# Patient Record
Sex: MALE | URBAN - METROPOLITAN AREA
[De-identification: names, ages, dates, MRNs, and addresses within clinical notes are randomized per-mention and may not be internally consistent; named-entity substitution may affect disease eponyms.]

---

## 2024-02-21 ENCOUNTER — APPOINTMENT (RX ONLY)
Dept: URBAN - METROPOLITAN AREA CLINIC 45 | Facility: CLINIC | Age: 79
Setting detail: DERMATOLOGY
End: 2024-02-21

## 2024-02-21 DIAGNOSIS — L21.8 OTHER SEBORRHEIC DERMATITIS: ICD-10-CM | Status: INADEQUATELY CONTROLLED

## 2024-02-21 PROCEDURE — ? PRESCRIPTION MEDICATION MANAGEMENT

## 2024-02-21 PROCEDURE — 99204 OFFICE O/P NEW MOD 45 MIN: CPT

## 2024-02-21 PROCEDURE — ? PRESCRIPTION

## 2024-02-21 PROCEDURE — ? COUNSELING

## 2024-02-21 RX ORDER — FLUOCINOLONE ACETONIDE 0.11 MG/ML
OIL AURICULAR (OTIC)
Qty: 20 | Refills: 2 | Status: ERX | COMMUNITY
Start: 2024-02-21

## 2024-02-21 RX ORDER — KETOCONAZOLE 20 MG/G
CREAM TOPICAL QD
Qty: 30 | Refills: 5 | Status: ERX | COMMUNITY
Start: 2024-02-21

## 2024-02-21 RX ORDER — KETOCONAZOLE 20 MG/ML
SHAMPOO, SUSPENSION TOPICAL BIW
Qty: 360 | Refills: 6 | Status: ERX | COMMUNITY
Start: 2024-02-21

## 2024-02-21 RX ADMIN — KETOCONAZOLE: 20 CREAM TOPICAL at 00:00

## 2024-02-21 RX ADMIN — KETOCONAZOLE: 20 SHAMPOO, SUSPENSION TOPICAL at 00:00

## 2024-02-21 RX ADMIN — FLUOCINOLONE ACETONIDE: 0.11 OIL AURICULAR (OTIC) at 00:00

## 2024-02-21 ASSESSMENT — LOCATION ZONE DERM
LOCATION ZONE: EYELID
LOCATION ZONE: EAR
LOCATION ZONE: FACE
LOCATION ZONE: SCALP

## 2024-02-21 ASSESSMENT — LOCATION DETAILED DESCRIPTION DERM
LOCATION DETAILED: RIGHT CRUS OF HELIX
LOCATION DETAILED: LEFT LATERAL SUPERIOR EYELID
LOCATION DETAILED: GLABELLA
LOCATION DETAILED: LEFT CRUS OF HELIX
LOCATION DETAILED: LEFT SUPERIOR PARIETAL SCALP
LOCATION DETAILED: RIGHT LATERAL SUPERIOR EYELID

## 2024-02-21 ASSESSMENT — LOCATION SIMPLE DESCRIPTION DERM
LOCATION SIMPLE: GLABELLA
LOCATION SIMPLE: RIGHT EAR
LOCATION SIMPLE: RIGHT SUPERIOR EYELID
LOCATION SIMPLE: LEFT EAR
LOCATION SIMPLE: LEFT SUPERIOR EYELID
LOCATION SIMPLE: SCALP

## 2024-02-21 NOTE — PROCEDURE: PRESCRIPTION MEDICATION MANAGEMENT
Detail Level: Zone
Render In Strict Bullet Format?: No
Initiate Treatment: .\\n.\\nketoconazole 2 % shampoo BIW\\nQuantity: 360.0 ml  Days Supply: 90\\nSig: Shampoo every other day, leave lather on scalp for 10 mins and rinse well.\\n.\\nketoconazole 2 % topical cream QD\\nQuantity: 30.0 g  Days Supply: 30\\nSig: Apply once daily to forehead, eyebrows and eyelids\\n.\\nDermOtic Oil 0.01 % ear drops \\nQuantity: 20.0 ml  Days Supply: 30\\nSig: Apply 5 drops to each ear twice daily\\n.

## 2024-02-21 NOTE — HPI: RASH (SEBORRHEIC DERMATITIS)
How Severe Is It?: mild
Is This A New Presentation, Or A Follow-Up?: Rash
Additional History: Dandruff shampoo

## 2024-03-20 ENCOUNTER — APPOINTMENT (RX ONLY)
Dept: URBAN - METROPOLITAN AREA CLINIC 45 | Facility: CLINIC | Age: 79
Setting detail: DERMATOLOGY
End: 2024-03-20

## 2024-03-20 DIAGNOSIS — L21.8 OTHER SEBORRHEIC DERMATITIS: ICD-10-CM | Status: INADEQUATELY CONTROLLED

## 2024-03-20 PROCEDURE — 99214 OFFICE O/P EST MOD 30 MIN: CPT

## 2024-03-20 PROCEDURE — ? TREATMENT REGIMEN

## 2024-03-20 PROCEDURE — ? PRESCRIPTION MEDICATION MANAGEMENT

## 2024-03-20 PROCEDURE — ? COUNSELING

## 2024-03-20 PROCEDURE — ? PRESCRIPTION

## 2024-03-20 RX ORDER — HYDROCORTISONE 25 MG/G
CREAM TOPICAL
Qty: 28 | Refills: 1 | Status: ERX | COMMUNITY
Start: 2024-03-20

## 2024-03-20 RX ADMIN — HYDROCORTISONE: 25 CREAM TOPICAL at 00:00

## 2024-03-20 ASSESSMENT — LOCATION ZONE DERM
LOCATION ZONE: EYELID
LOCATION ZONE: FACE
LOCATION ZONE: EAR
LOCATION ZONE: SCALP

## 2024-03-20 ASSESSMENT — LOCATION DETAILED DESCRIPTION DERM
LOCATION DETAILED: RIGHT LATERAL SUPERIOR EYELID
LOCATION DETAILED: LEFT CRUS OF HELIX
LOCATION DETAILED: LEFT LATERAL SUPERIOR EYELID
LOCATION DETAILED: GLABELLA
LOCATION DETAILED: LEFT SUPERIOR PARIETAL SCALP
LOCATION DETAILED: RIGHT CRUS OF HELIX

## 2024-03-20 ASSESSMENT — LOCATION SIMPLE DESCRIPTION DERM
LOCATION SIMPLE: RIGHT EAR
LOCATION SIMPLE: RIGHT SUPERIOR EYELID
LOCATION SIMPLE: LEFT SUPERIOR EYELID
LOCATION SIMPLE: GLABELLA
LOCATION SIMPLE: LEFT EAR
LOCATION SIMPLE: SCALP

## 2024-03-20 ASSESSMENT — SEVERITY ASSESSMENT: HOW SEVERE IS THIS PATIENT'S CONDITION?: MILD

## 2024-03-20 NOTE — PROCEDURE: TREATMENT REGIMEN
Initiate Treatment: Taraum shampoo every other day alternate with Ketoconzole shampoo
Detail Level: Zone

## 2024-03-20 NOTE — PROCEDURE: PRESCRIPTION MEDICATION MANAGEMENT
Detail Level: Zone
Render In Strict Bullet Format?: No
Discontinue Regimen: ketoconazole 2 % topical cream QD\\nQuantity: 30.0 g  Days Supply: 30\\nSig: Apply once daily to forehead, eyebrows and eyelids
Continue Regimen: ketoconazole 2 % shampoo BIW\\nQuantity: 360.0 ml  Days Supply: 90\\nSig: Shampoo every other day, leave lather on scalp for 10 mins and rinse well.\\n.\\n\\n.\\nDermOtic Oil 0.01 % ear drops \\nQuantity: 20.0 ml  Days Supply: 30\\nSig: Apply 5 drops to each ear twice daily\\n.
Initiate Treatment: Hydrocortisone 2.5% cream apply daily to face